# Patient Record
Sex: MALE | Race: WHITE | URBAN - METROPOLITAN AREA
[De-identification: names, ages, dates, MRNs, and addresses within clinical notes are randomized per-mention and may not be internally consistent; named-entity substitution may affect disease eponyms.]

---

## 2017-01-18 ENCOUNTER — PRE VISIT (OUTPATIENT)
Dept: SURGERY | Facility: CLINIC | Age: 67
End: 2017-01-18

## 2017-01-18 NOTE — TELEPHONE ENCOUNTER
1.  Date/reason for appt:  3/07/17   Fistula    2.  Referring provider:  Self    3.  Call to patient (Yes / No - short description):  No, transferred from [facility].      Dr Gretchen Cam [facility]  Emailed SERGIO forms to [email or web address]

## 2017-01-19 NOTE — TELEPHONE ENCOUNTER
Radiology reports received from [facility], will forward to clinic. Waiting for imaging disc.   Xray KUB plat plate abdomen on 12/4/15, 1/16/15  US renal kidney on 12/4/15, 4/16/14, 10/30/13  Xray kidney on 8/13/13  Xray chest on 8/6/13  CT abdomen pelvis on 8/6/13

## 2017-01-23 NOTE — TELEPHONE ENCOUNTER
Records received from Dr. Brannon's office - [facility]. Forwarded records to clinic.   (Waiting for imaging)     Records included:  Op note 4/29/14 Left buttock mass   Op note 8/1/14 Sigmoidoscopy - to rule out fistula   Path report 3/17/14 FNA Left Buttock  Path report 4/29/14 Left Buttock   CT Pelvis 2/5/14  US L Buttocks Nodule Biopsy 3/17/14  CT Pelvis 7/21/14  MR Pelvis 7/22/16     Missing/Additional Info:   -Referred to Dr. Echeverria in Colon and Rectal @ [facility] - had rectal exam under anesthesia on 12/2/14    -Went to [facility] for another opinion and had imaging done    -Also had a consult with Plastic Surgery @ [facility] in [place] for gracilis flap (It was determined that that this would not be a good idea because of sensation of needing to have bowel movement so it was not done.)     -Seen at [place] at [facility] and LIFT procedure was recommended

## 2017-01-24 NOTE — TELEPHONE ENCOUNTER
[facility] records forwarded to clinic.   Office notes: 5/15/15, 6/30/15, 7/21/15, 8/20/15, 9/4/15, 10/2/15, 11/20/15, 12/30/15, 3/4/16, 5/14/16   Op Notes:     8/20/15 I&D or perirectal abscess and seton placement     11/16/15 Partial fistulotomy and fistulectomy with washout    2/22/16 Rectal mucosal advancement flap     3/22/16 Placement of seton   Radiology Report:    MRI Pelvis 8/13/15     *missing 2014 records (Dr. Echeverria)

## 2017-02-03 ENCOUNTER — TRANSFERRED RECORDS (OUTPATIENT)
Dept: HEALTH INFORMATION MANAGEMENT | Facility: CLINIC | Age: 67
End: 2017-02-03

## 2017-02-21 ENCOUNTER — TELEPHONE (OUTPATIENT)
Dept: SURGERY | Facility: CLINIC | Age: 67
End: 2017-02-21

## 2017-02-21 NOTE — TELEPHONE ENCOUNTER
Due to provider unavailability, patient's appointment on 03/07/2017 needs to be rescheduled.  Called patient and left a message informing him of this.  Provided my direct number to reschedule.

## 2017-02-24 NOTE — TELEPHONE ENCOUNTER
Received patient's chronological health history, sent to clinic.      Sent second requests:  Imaging from [facility] Dr Godinez [place]  Records from Dr Echeverria [facility]

## 2017-03-01 NOTE — TELEPHONE ENCOUNTER
[facility] CT's Pelvis 2/05/14 and 7/21/14    Sent to film room

## 2017-03-03 ENCOUNTER — TELEPHONE (OUTPATIENT)
Dept: SURGERY | Facility: CLINIC | Age: 67
End: 2017-03-03

## 2017-03-03 NOTE — TELEPHONE ENCOUNTER
Spoke with Dr. Gray regarding patient's appointment, as patient already purchased plane tickets for appointment.  Dr. Gray has agreed to see patient 3/7/17 at 7:00 am.  Called and spoke with patient.  Patient confirms new appointment time.  Patient states he will be staying at the [facility].  Instructed patient he can walk to B and take the shuttle to [facility], or walk directly to [facility].  Patient states he will take the shuttle from [facility].  Patient has my direct number for questions or concerns.

## 2017-03-07 ENCOUNTER — OFFICE VISIT (OUTPATIENT)
Dept: SURGERY | Facility: CLINIC | Age: 67
End: 2017-03-07

## 2017-03-07 VITALS
HEART RATE: 63 BPM | TEMPERATURE: 97.7 F | HEIGHT: 70 IN | DIASTOLIC BLOOD PRESSURE: 89 MMHG | BODY MASS INDEX: 28.22 KG/M2 | WEIGHT: 197.1 LBS | SYSTOLIC BLOOD PRESSURE: 136 MMHG | OXYGEN SATURATION: 99 %

## 2017-03-07 DIAGNOSIS — K60.30 ANAL FISTULA: Primary | ICD-10-CM

## 2017-03-07 RX ORDER — NIACIN 500 MG/1
TABLET ORAL
COMMUNITY
Start: 2000-01-01

## 2017-03-07 RX ORDER — TAMSULOSIN HYDROCHLORIDE 0.4 MG/1
CAPSULE ORAL
COMMUNITY
Start: 2012-12-12

## 2017-03-07 RX ORDER — AMOXICILLIN 500 MG
CAPSULE ORAL
COMMUNITY
Start: 2000-01-01

## 2017-03-07 RX ORDER — IBUPROFEN 200 MG
TABLET ORAL
COMMUNITY
Start: 2016-01-01

## 2017-03-07 RX ORDER — EZETIMIBE 10 MG/1
TABLET ORAL
COMMUNITY
Start: 2000-01-01

## 2017-03-07 RX ORDER — CHOLECALCIFEROL (VITAMIN D3) 25 MCG
CAPSULE ORAL
COMMUNITY
Start: 2010-01-01

## 2017-03-07 RX ORDER — MIRTAZAPINE 15 MG/1
TABLET, FILM COATED ORAL
COMMUNITY
Start: 2017-02-15

## 2017-03-07 RX ORDER — ROSUVASTATIN CALCIUM 10 MG/1
TABLET, COATED ORAL
COMMUNITY
Start: 2000-01-01

## 2017-03-07 ASSESSMENT — PAIN SCALES - GENERAL: PAINLEVEL: MILD PAIN (2)

## 2017-03-07 NOTE — MR AVS SNAPSHOT
"              After Visit Summary   3/7/2017    Renaldo Louie    MRN: [id number]           Patient Information     Date Of Birth          1950        Visit Information        Provider Department      3/7/2017 7:00 AM Matt Gray MD [facility] Colon and Rectal Surgery        Today's Diagnoses     Anal fistula    -  1       Follow-ups after your visit        Who to contact     Please call your clinic at [phone] to:    Ask questions about your health    Make or cancel appointments    Discuss your medicines    Learn about your test results    Speak to your doctor   If you have compliments or concerns about an experience at your clinic, or if you wish to file a complaint, please contact [facility] Patient Relations at [phone] or email us at [email or web address]         Additional Information About Your Visit        MyChart Information     [employer] gives you secure access to your electronic health record. If you see a primary care provider, you can also send messages to your care team and make appointments. If you have questions, please call your primary care clinic.  If you do not have a primary care provider, please call [phone] and they will assist you.      [employer] is an electronic gateway that provides easy, online access to your medical records. With [employer], you can request a clinic appointment, read your test results, renew a prescription or communicate with your care team.     To access your existing account, please contact your [facility] or call [phone] for assistance.        Care EveryWhere ID     This is your Care EveryWhere ID. This could be used by other organizations to access your [facility] medical records  [id number]        Your Vitals Were     Pulse Temperature Height Pulse Oximetry BMI (Body Mass Index)       63 97.7  F (36.5  C) (Oral) 1.778 m (5' 10\") 99% 28.28 kg/m2        Blood Pressure from Last 3 " Encounters:   No data found for BP    Weight from Last 3 Encounters:   No data found for Wt              Today, you had the following     No orders found for display       Primary Care Provider    None       No address on file        Thank you!     Thank you for choosing [facility] COLON AND RECTAL SURGERY  for your care. Our goal is always to provide you with excellent care. Hearing back from our patients is one way we can continue to improve our services. Please take a few minutes to complete the written survey that you may receive in the mail after your visit with us. Thank you!             Your Updated Medication List - Protect others around you: Learn how to safely use, store and throw away your medicines at www.disposemymeds.org.          This list is accurate as of: 3/7/17 11:59 PM.  Always use your most recent med list.                   Brand Name Dispense Instructions for use    aspirin 81 MG tablet          CRESTOR 10 MG tablet   Generic drug:  rosuvastatin          ezetimibe 10 MG tablet    ZETIA         Fish Oil 1200 MG Caps          ibuprofen 200 MG tablet    ADVIL/MOTRIN         mirtazapine 15 MG tablet    REMERON         Niacin (Antihyperlipidemic) 500 MG Tabs          RA GAUZE BANDAGE Misc      Apply 2-3 Units topically       tamsulosin 0.4 MG capsule    FLOMAX         Vitamin D-3 1000 UNITS Caps

## 2017-03-07 NOTE — PROGRESS NOTES
Colon and Rectal Surgery Clinic Note      RE: Renaldo Louie  : 1950  LAMAR: 3/7/2017            For details of past medical history, surgical history, family history, medications, allergies, and review of systems, please see details below.    Medical history:  No past medical history on file.    Surgical history:HISTORY OF PRESENT ILLNESS:  Dakota is an extremely pleasant retired  who is seen today for an additional opinion regarding an anal fistula.  Dakota snowden from [place], [place].  He has a history of brachytherapy for prostate cancer in .  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  This did not evolve rapidly, but he was advised to have this taken care of, and he underwent incision and drainage of chronic abscess by Dr. Martín Godinez.  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  He was seen by Dr. Cain Echeverria and underwent 3 operations in order to attempt to shorten the fistula tract in  and .  We do not have those records available.  Dr. Echeverria placed a seton.  The patient felt communication was poor and he transferred his care to [place] where he was treated by Dr. Mariana Paige.  He had several additional exams under anesthesia with an attempt to shorten his tract.  Because of the brachytherapy, it was felt that hyperbaric oxygen therapy might be helpful, and he underwent 60 treatments.  On 2016, Dr. Paige performed an endorectal mucosal advancement flap.  This procedure subsequently failed and a seton was replaced.  The patient met with 2 plastic surgeons, one of whom advised a gracilis flap and the second one advised against it.  Because of ongoing drainage, the patient now presents to the [facility] for an additional opinion and possible treatment.  Dakota reports that his continence has remained intact, but he has enough urgency  that when he gets a call to stool that he must start looking for a bathroom.  This is not severe urgency and he has time to get there and has not had accidents, but it is at least a concern.  He does have fairly copious drainage from his fistula.  He also has a low level of discomfort which in general is not terribly bothersome but episodically can interfere with his lifestyle.  He is an avid fly fisherman and hiker.      Dakota is a nonsmoker and a moderate drinker.  He is a retired .  He is  and lives with his wife.      PHYSICAL EXAMINATION:  Well-developed, well-nourished, very pleasant man in no acute distress.  The patient is 5 feet 10 inches and weighs 197 pounds.  BMI is 28.  Perianal examination demonstrates a loose seton emerging from a chronic external opening in the left buttock.  The external opening is still roughly 5 cm from the anal margin, slightly posterior of the mid anal line.  There is palpable scarring beneath this emanating towards the posterior midline.  Digital rectal examination shows good sphincter tone and an easily palpable chronically scarred internal opening.  There was good squeeze pressure.  There are no masses and no palpable prostatic abnormalities.  The prostate feels shrunken.  Anoscopy confirms a relatively wide-mouthed internal opening in the posterior midline with the seton emerging from it.      I reviewed the patient's recent outside MRI from 07/15/2016.  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      I had a detailed discussion with Dakota regarding the treatment options.  I explained that complex fistulas such as his are difficult to treat, a fact he is already very well aware of, and success rates are not high irrespective of the methods chosen.   I am a little dubious that a gracilis flap procedure would likely be beneficial as it would not reliably address the major  problem of the internal opening in the anal wall.  I am concerned about the possibility of the gracilis actually fitting into the necessary space.  I believe the best options would include an attempted LIFT procedure.  I used our rectal model to explain the nature of the procedure in detail to the patient.  I told him I thought the success rate would be at best on the order of 20%-25%, including the possibility of requiring a fistulotomy if the fistula was able to be converted to an intersphincteric rather than transsphincteric fistula.  I should note that on the MRI the fistula extends to but remains below the puborectalis.  I explained that the procedure could result in alterations in continence, especially if a secondary fistulotomy was necessary.  The other option would be a full-thickness endorectal advancement flap, which I believe has a higher success rate than a mucosal advancement flap.  Nonetheless, I believe this still would have a substantial failure rate, and I again quoted a potential success rate with this procedure of 20%-25%.  We discussed the potential role for diverting ileostomy.  I explained that the data supporting ileostomy was relatively sparse, but given this is a multiply reoperated area with several failures, it would probably be wise to place a laparoscopic ileostomy to take the most aggressive possible stance for what is probably his best remaining shot of treating the fistula.  We also discussed the possibility of simply leaving the seton in on an indefinite basis and finally discussed performing a complete fistulotomy, which some specialists are performing, though I explained this might have quite a marked effect on his continence.  He is not interested in that option, and I did not advise it.      I told Dakota I would try and communicate with Dr. Echeverria and get her previous operative notes.  I should note that he has had multiple colonoscopies over the years and has never had anything  "suggestive of Crohn disease.  His bowels are normal and he does not have diarrhea to suggest underlying Crohn disease.      Dakota will think the matter over, and I told him I would similarly do so.  He will stay in touch Candi Barakat RN      I answered all of Dakota' questions to his stated satisfaction.  He expressed an understanding and agreement with the proposed plan.      Total time 45 minutes.  Greater than half the time spent in counseling.         No past surgical history on file.    Family history:  No family history on file.    Medications:  Current Outpatient Prescriptions   Medication Sig Dispense Refill     aspirin 81 MG tablet        rosuvastatin (CRESTOR) 10 MG tablet        ezetimibe (ZETIA) 10 MG tablet        Omega-3 Fatty Acids (FISH OIL) 1200 MG CAPS        ibuprofen (ADVIL/MOTRIN) 200 MG tablet        mirtazapine (REMERON) 15 MG tablet        Niacin, Antihyperlipidemic, 500 MG TABS        tamsulosin (FLOMAX) 0.4 MG capsule        Cholecalciferol (VITAMIN D-3) 1000 UNITS CAPS        Gauze Pads & Dressings (RA GAUZE BANDAGE) MISC Apply 2-3 Units topically       Allergies:  The patienthas No Known Allergies.    Social history:  Social History   Substance Use Topics     Smoking status: Never Smoker     Smokeless tobacco: Not on file     Alcohol use 4.2 oz/week     7 Standard drinks or equivalent per week      Comment: 1 drink with dinner     Marital status: .    Review of Systems:  Nursing Notes:   Brittnee Mustafa LPN  3/7/2017  7:19 AM  Signed  Chief Complaint   Patient presents with     Consult     fistula per Dr. Martín Godinez       Vitals:    03/07/17 0708   BP: 136/89   BP Location: Left arm   Patient Position: Chair   Cuff Size: Adult Large   Pulse: 63   Temp: 97.7  F (36.5  C)   TempSrc: Oral   SpO2: 99%   Weight: 197 lb 1.6 oz   Height: 5' 10\"       Body mass index is 28.28 kg/(m^2).      Brittnee Gray MD   Professor and Chief  Division " of Colon and Rectal Surgery  [facility]      Referring Provider:  Referred Self, MD  No address on file     Primary Care Provider:  None    Answers for HPI/ROS submitted by the patient on 3/4/2017   General Symptoms: No  Skin Symptoms: No  HENT Symptoms: No  EYE SYMPTOMS: No  HEART SYMPTOMS: No  LUNG SYMPTOMS: No  INTESTINAL SYMPTOMS: No  URINARY SYMPTOMS: No  REPRODUCTIVE SYMPTOMS: No  SKELETAL SYMPTOMS: No  BLOOD SYMPTOMS: No  NERVOUS SYSTEM SYMPTOMS: No  MENTAL HEALTH SYMPTOMS: No

## 2017-03-07 NOTE — PROGRESS NOTES
"Colon and Rectal Surgery Clinic Note      RE: Renaldo Louie  : 1950  LAMAR: 3/7/2017            For details of past medical history, surgical history, family history, medications, allergies, and review of systems, please see details below.    Medical history:  No past medical history on file.    Surgical history:  No past surgical history on file.    Family history:  No family history on file.    Medications:  Current Outpatient Prescriptions   Medication Sig Dispense Refill     aspirin 81 MG tablet        rosuvastatin (CRESTOR) 10 MG tablet        ezetimibe (ZETIA) 10 MG tablet        Omega-3 Fatty Acids (FISH OIL) 1200 MG CAPS        ibuprofen (ADVIL/MOTRIN) 200 MG tablet        mirtazapine (REMERON) 15 MG tablet        Niacin, Antihyperlipidemic, 500 MG TABS        tamsulosin (FLOMAX) 0.4 MG capsule        Cholecalciferol (VITAMIN D-3) 1000 UNITS CAPS        Gauze Pads & Dressings (RA GAUZE BANDAGE) MISC Apply 2-3 Units topically       Allergies:  The patienthas No Known Allergies.    Social history:  Social History   Substance Use Topics     Smoking status: Never Smoker     Smokeless tobacco: Not on file     Alcohol use 4.2 oz/week     7 Standard drinks or equivalent per week      Comment: 1 drink with dinner     Marital status: .    Review of Systems:  Nursing Notes:   Brittnee Mustafa LPN  3/7/2017  7:19 AM  Signed  Chief Complaint   Patient presents with     Consult     fistula per Dr. Martín Godinez       Vitals:    17 0708   BP: 136/89   BP Location: Left arm   Patient Position: Chair   Cuff Size: Adult Large   Pulse: 63   Temp: 97.7  F (36.5  C)   TempSrc: Oral   SpO2: 99%   Weight: 197 lb 1.6 oz   Height: 5' 10\"       Body mass index is 28.28 kg/(m^2).      Brittnee Grya MD   Professor and Chief  Division of Colon and Rectal Surgery  [facility]      Referring Provider:  Referred Self, MD  No address on file "     Primary Care Provider:  None

## 2017-03-07 NOTE — Clinical Note
3/7/2017       RE: Renaldo Louie  [address] [place] [id number]     Dear Colleague,    Thank you for referring your patient, Renaldo Louie, to the [facility] COLON [facility] RE[facility]AL SURGERY at [facility]. Please see a copy of my visit note below.    Colon and Rectal Surgery Clinic Note      RE: Renaldo Louie  : 1950  LAMAR: 3/7/2017            For details of past medical history, surgical history, family history, medications, allergies, and review of systems, please see details below.    Medical history:  No past medical history on file.    Surgical history:HISTORY OF PRESENT ILLNESS:  Dakota is an extremely pleasant retired  who is seen today for an additional opinion regarding an anal fistula.  Dakota snowden from [place], [place].  He has a history of brachytherapy for prostate cancer in .  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  This did not evolve rapidly, but he was advised to have this taken care of, and he underwent incision and drainage of chronic abscess by Dr. Martín Godinez.  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  He was seen by Dr. Cain Echeverria and underwent 3 operations in order to attempt to shorten the fistula tract in  and .  We do not have those records available.  Dr. Echeverria placed a seton.  The patient felt communication was poor and he transferred his care to [place] where he was treated by Dr. Mariana Paige.  He had several additional exams under anesthesia with an attempt to shorten his tract.  Because of the brachytherapy, it was felt that hyperbaric oxygen therapy might be helpful, and he underwent 60 treatments.  On 2016, Dr. Paige performed an endorectal mucosal advancement flap.  This procedure subsequently failed and a seton was replaced.  The patient met with 2 plastic surgeons, one  of whom advised a gracilis flap and the second one advised against it.  Because of ongoing drainage, the patient now presents to the [facility] for an additional opinion and possible treatment.  Dakota reports that his continence has remained intact, but he has enough urgency that when he gets a call to stool that he must start looking for a bathroom.  This is not severe urgency and he has time to get there and has not had accidents, but it is at least a concern.  He does have fairly copious drainage from his fistula.  He also has a low level of discomfort which in general is not terribly bothersome but episodically can interfere with his lifestyle.  He is an avid fly fisherman and hiker.      Dakota is a nonsmoker and a moderate drinker.  He is a retired .  He is  and lives with his wife.      PHYSICAL EXAMINATION:  Well-developed, well-nourished, very pleasant man in no acute distress.  The patient is 5 feet 10 inches and weighs 197 pounds.  BMI is 28.  Perianal examination demonstrates a loose seton emerging from a chronic external opening in the left buttock.  The external opening is still roughly 5 cm from the anal margin, slightly posterior of the mid anal line.  There is palpable scarring beneath this emanating towards the posterior midline.  Digital rectal examination shows good sphincter tone and an easily palpable chronically scarred internal opening.  There was good squeeze pressure.  There are no masses and no palpable prostatic abnormalities.  The prostate feels shrunken.  Anoscopy confirms a relatively wide-mouthed internal opening in the posterior midline with the seton emerging from it.      I reviewed the patient's recent outside MRI from 07/15/2016.  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      I had a detailed discussion with Dakota regarding the treatment options.  I explained  that complex fistulas such as his are difficult to treat, a fact he is already very well aware of, and success rates are not high irrespective of the methods chosen.   I am a little dubious that a gracilis flap procedure would likely be beneficial as it would not reliably address the major problem of the internal opening in the anal wall.  I am concerned about the possibility of the gracilis actually fitting into the necessary space.  I believe the best options would include an attempted LIFT procedure.  I used our rectal model to explain the nature of the procedure in detail to the patient.  I told him I thought the success rate would be at best on the order of 20%-25%, including the possibility of requiring a fistulotomy if the fistula was able to be converted to an intersphincteric rather than transsphincteric fistula.  I should note that on the MRI the fistula extends to but remains below the puborectalis.  I explained that the procedure could result in alterations in continence, especially if a secondary fistulotomy was necessary.  The other option would be a full-thickness endorectal advancement flap, which I believe has a higher success rate than a mucosal advancement flap.  Nonetheless, I believe this still would have a substantial failure rate, and I again quoted a potential success rate with this procedure of 20%-25%.  We discussed the potential role for diverting ileostomy.  I explained that the data supporting ileostomy was relatively sparse, but given this is a multiply reoperated area with several failures, it would probably be wise to place a laparoscopic ileostomy to take the most aggressive possible stance for what is probably his best remaining shot of treating the fistula.  We also discussed the possibility of simply leaving the seton in on an indefinite basis and finally discussed performing a complete fistulotomy, which some specialists are performing, though I explained this might have quite a  marked effect on his continence.  He is not interested in that option, and I did not advise it.      I told Dakota I would try and communicate with Dr. Echeverria and get her previous operative notes.  I should note that he has had multiple colonoscopies over the years and has never had anything suggestive of Crohn disease.  His bowels are normal and he does not have diarrhea to suggest underlying Crohn disease.      Dakota will think the matter over, and I told him I would similarly do so.  He will stay in touch Candi Barakat RN      I answered all of Dakota' questions to his stated satisfaction.  He expressed an understanding and agreement with the proposed plan.      Total time 45 minutes.  Greater than half the time spent in counseling.         No past surgical history on file.    Family history:  No family history on file.    Medications:  Current Outpatient Prescriptions   Medication Sig Dispense Refill     aspirin 81 MG tablet        rosuvastatin (CRESTOR) 10 MG tablet        ezetimibe (ZETIA) 10 MG tablet        Omega-3 Fatty Acids (FISH OIL) 1200 MG CAPS        ibuprofen (ADVIL/MOTRIN) 200 MG tablet        mirtazapine (REMERON) 15 MG tablet        Niacin, Antihyperlipidemic, 500 MG TABS        tamsulosin (FLOMAX) 0.4 MG capsule        Cholecalciferol (VITAMIN D-3) 1000 UNITS CAPS        Gauze Pads & Dressings (RA GAUZE BANDAGE) MISC Apply 2-3 Units topically       Allergies:  The patienthas No Known Allergies.    Social history:  Social History   Substance Use Topics     Smoking status: Never Smoker     Smokeless tobacco: Not on file     Alcohol use 4.2 oz/week     7 Standard drinks or equivalent per week      Comment: 1 drink with dinner     Marital status: .    Review of Systems:  Nursing Notes:   Brittnee Mustafa LPN  3/7/2017  7:19 AM  Signed  Chief Complaint   Patient presents with     Consult     fistula per Dr. Martín Godinez       Vitals:    03/07/17 0708   BP: 136/89   BP Location: Left arm   Patient  "Position: Chair   Cuff Size: Adult Large   Pulse: 63   Temp: 97.7  F (36.5  C)   TempSrc: Oral   SpO2: 99%   Weight: 197 lb 1.6 oz   Height: 5' 10\"       Body mass index is 28.28 kg/(m^2).      Brittnee Gray MD   Professor and Chief  Division of Colon and Rectal Surgery  [facility]      Referring Provider:  Referred Self, MD  No address on file     Primary Care Provider:  None    Answers for HPI/ROS submitted by the patient on 3/4/2017   General Symptoms: No  Skin Symptoms: No  HENT Symptoms: No  EYE SYMPTOMS: No  HEART SYMPTOMS: No  LUNG SYMPTOMS: No  INTESTINAL SYMPTOMS: No  URINARY SYMPTOMS: No  REPRODUCTIVE SYMPTOMS: No  SKELETAL SYMPTOMS: No  BLOOD SYMPTOMS: No  NERVOUS SYSTEM SYMPTOMS: No  MENTAL HEALTH SYMPTOMS: No      Again, thank you for allowing me to participate in the care of your patient.      Sincerely,    Matt Gray MD    "

## 2017-03-07 NOTE — LETTER
Date:March 15, 2017      Patient was self referred, no letter generated. Do not send.        [facility] Information

## 2017-03-07 NOTE — NURSING NOTE
"Chief Complaint   Patient presents with     Consult     fistula per Dr. Martín Godinez       Vitals:    03/07/17 0708   BP: 136/89   BP Location: Left arm   Patient Position: Chair   Cuff Size: Adult Large   Pulse: 63   Temp: 97.7  F (36.5  C)   TempSrc: Oral   SpO2: 99%   Weight: 197 lb 1.6 oz   Height: 5' 10\"       Body mass index is 28.28 kg/(m^2).      Brittnee Mustafa                          "

## 2017-03-13 NOTE — TELEPHONE ENCOUNTER
Forwarded [facility] and [facility] records to clinic.

## 2018-01-17 ENCOUNTER — TRANSFERRED RECORDS (OUTPATIENT)
Dept: HEALTH INFORMATION MANAGEMENT | Facility: CLINIC | Age: 68
End: 2018-01-17

## 2020-03-10 ENCOUNTER — HEALTH MAINTENANCE LETTER (OUTPATIENT)
Age: 70
End: 2020-03-10

## 2020-07-16 ENCOUNTER — TELEPHONE (OUTPATIENT)
Dept: SURGERY | Facility: CLINIC | Age: 70
End: 2020-07-16

## 2020-07-16 NOTE — TELEPHONE ENCOUNTER
[facility] Call Center    Phone Message    May a detailed message be left on voicemail: yes     Reason for Call: Other: Pt calling in to speak with the provider regarding a prior consultation he had on 3/17/2020. Please follow up when available. Thank you      Action Taken: Message routed to:  [facility] Colon Rect [facility]urg    Travel Screening: Not Applicable

## 2020-07-17 NOTE — TELEPHONE ENCOUNTER
Pt wants to discuss fistula repair with Dr. Gray and possible bx of tissue near fistula. Will get recs from OSH and set up virtual visit. Pt stated understanding

## 2020-07-20 ENCOUNTER — DOCUMENTATION ONLY (OUTPATIENT)
Dept: SURGERY | Facility: CLINIC | Age: 70
End: 2020-07-20

## 2020-07-20 NOTE — PROGRESS NOTES
Action 7/20/2020 4:19 PM -ao   Action Taken Fax request sent to [facility] for Ileostomy Report per request for RN.

## 2020-07-22 NOTE — PROGRESS NOTES
"Colon and Rectal Surgery Clinic Note      RE: Renaldo Louie  : 1950  LAMAR: 2020    Renaldo Louie is a 70 year old male who is being evaluated via a billable video visit.      The patient has been notified of following:     \"This video visit will be conducted via a call between you and your physician/provider. We have found that certain health care needs can be provided without the need for an in-person physical exam.  This service lets us provide the care you need with a video conversation.  If a prescription is necessary we can send it directly to your pharmacy.  If lab work is needed we can place an order for that and you can then stop by our lab to have the test done at a later time.    Video visits are billed at different rates depending on your insurance coverage.  Please reach out to your insurance provider with any questions.    If during the course of the call the physician/provider feels a video visit is not appropriate, you will not be charged for this service.\"    Patient has given verbal consent for Video visit? Yes    Video Start Time: 10:33 error: 10:33 end time.  Start time: 10:07    Dakota has a visit today for follow up of anal fistula.    HPI:  I met with Dakota in 2017 for his anal fistula.    History of brachytherapy for prostate cancer in .      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  This did not evolve rapidly, but he was advised to have this taken care of, and he underwent incision and drainage of chronic abscess by Dr. Martín Godinez.      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  He was seen by Dr. Cain Echeverria and underwent 3 operations in order to attempt to shorten the fistula tract in  and . Dr. Echeverria placed a seton.    He transferred his care to [place] where he was treated by Dr. Mariana Paige.  He had several additional " exams under anesthesia with an attempt to shorten his tract.  Because of the brachytherapy, it was felt that hyperbaric oxygen therapy might be helpful, and he underwent 60 treatments.      On 02/22/2016, Dr. Paige performed an endorectal mucosal advancement flap.  This procedure subsequently failed and a seton was replaced.  The patient met with 2 plastic surgeons, one of whom advised a gracilis flap and the second one advised against it.    Multiple colonoscopies over the years and has never had anything suggestive of Crohn disease    When I met with him in 2017, I had recommended a LIFT procedure with diverting ileostomy    Exam from 2017: loose seton emerging from a chronic external opening in the left buttock.  The external opening is still roughly 5 cm from the anal margin, slightly posterior of the mid anal line.  There is palpable scarring beneath this emanating towards the posterior midline.  Digital rectal examination shows good sphincter tone and an easily palpable chronically scarred internal opening.  There was good squeeze pressure.  There are no masses and no palpable prostatic abnormalities.  The prostate feels shrunken.  Anoscopy confirms a relatively wide-mouthed internal opening in the posterior midline with the seton emerging from it.     Laboratory data:    No results for input(s): WBC, HGB, PLT, CR, ALBUMIN, BILITOTAL, ALKPHOS, ALT, AST, INR in the last [id number] hours.      Updated history:        Assessment/plan: Since I last saw Dakota he has established care with Dr. Boris Means in [place].  Dr. Means performed a laparoscopic ileostomy and this has substantially improved the patient's fistula symptoms.  He is managing the ileostomy without much difficulty, though he did require an emergency room visit for what sounds like a parastomal hernia that was managed conservatively.  He was observed in the hospital overnight and discharged.  The pelvic pain associated with his fistula has largely  relented, though more recently he has had some new symptoms and Dr. Means plans to place a seton.  They have discussed another attempt at closing the internal opening, which lies in the posterior midline.  Dr. Means has advised a repeat mucosal advancement flap.  I had previously advised a LIFT procedure, which would at least offer an unsightly operative field as rest had a previous failed mucosal advancement flap by Dr. Mariana Paige.  I advised that given the multiple previous operations and brachii therapy that either operation would have a very significant failure rate; my best guess is that a LIFTwould have a 20 to 25% success rate.  I think the success of an endorectal advancement flap would be similar or less, and opined that a full-thickness flap to my mind would have a higher chance of healing than a mucosal flap alone.  Dakota is consulted several plastic surgeons regarding the previous suggestion of a gracilis transposition.  I again emphasized that the fundamental problem is the internal opening that needs to be healed and that a gracilis would not necessarily accomplish this.  His tract has evidently shrunken, and in the absence of an actual cavity exactly how to get the chrysalis in place and where to put it seems to me to be problematic.  The most recent plastic surgery consultations seem to have advised against the gracilis so I do not think this is an active issue.  I endorsed the plan for seton at present.  Dakota can decide if he wants to maintain his stoma on a permanent basis and, if so, he might consider having a colostomy placed rather than an ileostomy to facilitate management and allow him the opportunity of stoma irrigation if he prefers.  If he has recurrent problems with his parastomal hernia, he may require an operation in any case.  We reviewed all the foregoing in very substantial detail.  I answered all of aDkota's questions to his stated satisfaction.  He has a quite clear understanding of his  situation and has done quite a bit of reading about it.  I answered all of his questions to his stated satisfaction.  He expressed his understanding.  His plan is to continue his ongoing care with Dr. Means, and I endorsed him as an outstanding clinician and surgeon.    Total time 26 minutes.  Essentially the entire time was counseling.    Video-Visit Details    Type of service:  Video Visit    Video End Time (time video stopped): 10:33    Originating Location (pt. Location): Home    Distant Location (provider location):  [facility] COLON AND RECTAL SURGERY     Mode of Communication:  Video Conference via Piece[employer].    For details of past medical history, surgical history, family history, medications, allergies, and review of systems, please see details below.    Medical history:  No past medical history on file.    Surgical history:  No past surgical history on file.    Family history:  No family history on file.    Medications:  Current Outpatient Medications   Medication Sig Dispense Refill     aspirin 81 MG tablet        Cholecalciferol (VITAMIN D-3) 1000 UNITS CAPS        ezetimibe (ZETIA) 10 MG tablet        Gauze Pads & Dressings (RA GAUZE BANDAGE) MISC Apply 2-3 Units topically       ibuprofen (ADVIL/MOTRIN) 200 MG tablet        mirtazapine (REMERON) 15 MG tablet        Niacin, Antihyperlipidemic, 500 MG TABS        Omega-3 Fatty Acids (FISH OIL) 1200 MG CAPS        rosuvastatin (CRESTOR) 10 MG tablet        tamsulosin (FLOMAX) 0.4 MG capsule          Allergies:  The patienthas No Known Allergies.    Social history:  Social History     Tobacco Use     Smoking status: Never Smoker   Substance Use Topics     Alcohol use: Yes     Alcohol/week: 7.0 standard drinks     Types: 7 Standard drinks or equivalent per week     Comment: 1 drink with dinner     Marital status: .    Review of Systems:  There are no exam notes on file for this visit.     Matt Gray MD   Professor and Chief  Division of Colon and  Rectal Surgery  [facility]      Referring Provider:  Referred Self, MD  No address on file     Primary Care Provider:  No primary care provider on file.    This note was created using speech recognition software and may contain unintended word substitutions.

## 2020-07-23 ENCOUNTER — VIRTUAL VISIT (OUTPATIENT)
Dept: SURGERY | Facility: CLINIC | Age: 70
End: 2020-07-23
Payer: COMMERCIAL

## 2020-07-23 VITALS — HEIGHT: 70 IN | WEIGHT: 188 LBS | BODY MASS INDEX: 26.92 KG/M2

## 2020-07-23 DIAGNOSIS — K60.30 ANAL FISTULA: Primary | ICD-10-CM

## 2020-07-23 ASSESSMENT — PAIN SCALES - GENERAL: PAINLEVEL: NO PAIN (0)

## 2020-07-23 ASSESSMENT — MIFFLIN-ST. JEOR: SCORE: 1619.01

## 2020-07-23 NOTE — LETTER
"2020       RE: Renaldo Louie  [address] [place] [address]     Dear Colleague,    Thank you for referring your patient, Renaldo Louie, to the [facility] Please see a copy of my visit note below.    Colon and Rectal Surgery Clinic Note      RE: Renaldo Louie  : 1950  LAMAR: 2020    Renaldo Louie is a 70 year old male who is being evaluated via a billable video visit.      The patient has been notified of following:     \"This video visit will be conducted via a call between you and your physician/provider. We have found that certain health care needs can be provided without the need for an in-person physical exam.  This service lets us provide the care you need with a video conversation.  If a prescription is necessary we can send it directly to your pharmacy.  If lab work is needed we can place an order for that and you can then stop by our lab to have the test done at a later time.    Video visits are billed at different rates depending on your insurance coverage.  Please reach out to your insurance provider with any questions.    If during the course of the call the physician/provider feels a video visit is not appropriate, you will not be charged for this service.\"    Patient has given verbal consent for Video visit? Yes    Video Start Time: 10:33 error: 10:33 end time.  Start time: 10:07    Dakota has a visit today for follow up of anal fistula.    HPI:  I met with Dakota in 2017 for his anal fistula.    History of brachytherapy for prostate cancer in .      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  This did not evolve rapidly, but he was advised to have this taken care of, and he underwent incision and drainage of chronic abscess by Dr. Martín Godinez.      He had a chronically draining wound and CT confirmed the " presence of an anal fistula.  He was seen by Dr. Cain Echeverria and underwent 3 operations in order to attempt to shorten the fistula tract in 2014 and 2015. Dr. Echeverria placed a seton.    He transferred his care to [place] where he was treated by Dr. Mariana Paige.  He had several additional exams under anesthesia with an attempt to shorten his tract.  Because of the brachytherapy, it was felt that hyperbaric oxygen therapy might be helpful, and he underwent 60 treatments.      On 02/22/2016, Dr. Paige performed an endorectal mucosal advancement flap.  This procedure subsequently failed and a seton was replaced.  The patient met with 2 plastic surgeons, one of whom advised a gracilis flap and the second one advised against it.    Multiple colonoscopies over the years and has never had anything suggestive of Crohn disease    When I met with him in 2017, I had recommended a LIFT procedure with diverting ileostomy    Exam from 2017: loose seton emerging from a chronic external opening in the left buttock.  The external opening is still roughly 5 cm from the anal margin, slightly posterior of the mid anal line.  There is palpable scarring beneath this emanating towards the posterior midline.  Digital rectal examination shows good sphincter tone and an easily palpable chronically scarred internal opening.  There was good squeeze pressure.  There are no masses and no palpable prostatic abnormalities.  The prostate feels shrunken.  Anoscopy confirms a relatively wide-mouthed internal opening in the posterior midline with the seton emerging from it.     Laboratory data:    No results for input(s): WBC, HGB, PLT, CR, ALBUMIN, BILITOTAL, ALKPHOS, ALT, AST, INR in the last 65920 hours.      Updated history:        Assessment/plan: Since I last saw Dakota he has established care with Dr. Boris Means in [place].  Dr. Means performed a laparoscopic ileostomy and this has substantially improved the patient's fistula symptoms.  He is  managing the ileostomy without much difficulty, though he did require an emergency room visit for what sounds like a parastomal hernia that was managed conservatively.  He was observed in the hospital overnight and discharged.  The pelvic pain associated with his fistula has largely relented, though more recently he has had some new symptoms and Dr. Means plans to place a seton.  They have discussed another attempt at closing the internal opening, which lies in the posterior midline.  Dr. Means has advised a repeat mucosal advancement flap.  I had previously advised a LIFT procedure, which would at least offer an unsightly operative field as rest had a previous failed mucosal advancement flap by Dr. Mariana Paige.  I advised that given the multiple previous operations and brachii therapy that either operation would have a very significant failure rate; my best guess is that a LIFTwould have a 20 to 25% success rate.  I think the success of an endorectal advancement flap would be similar or less, and opined that a full-thickness flap to my mind would have a higher chance of healing than a mucosal flap alone.  Dakota is consulted several plastic surgeons regarding the previous suggestion of a gracilis transposition.  I again emphasized that the fundamental problem is the internal opening that needs to be healed and that a gracilis would not necessarily accomplish this.  His tract has evidently shrunken, and in the absence of an actual cavity exactly how to get the chrysalis in place and where to put it seems to me to be problematic.  The most recent plastic surgery consultations seem to have advised against the gracilis so I do not think this is an active issue.  I endorsed the plan for seton at present.  Dakota can decide if he wants to maintain his stoma on a permanent basis and, if so, he might consider having a colostomy placed rather than an ileostomy to facilitate management and allow him the opportunity of stoma irrigation  if he prefers.  If he has recurrent problems with his parastomal hernia, he may require an operation in any case.  We reviewed all the foregoing in very substantial detail.  I answered all of Dakota's questions to his stated satisfaction.  He has a quite clear understanding of his situation and has done quite a bit of reading about it.  I answered all of his questions to his stated satisfaction.  He expressed his understanding.  His plan is to continue his ongoing care with Dr. Means, and I endorsed him as an outstanding clinician and surgeon.    Total time 26 minutes.  Essentially the entire time was counseling.    Video-Visit Details    Type of service:  Video Visit    Video End Time (time video stopped): 10:33    Originating Location (pt. Location): Home    Distant Location (provider location):  [facility] COLON AND RECTAL SURGERY     Mode of Communication:  Video Conference via [employer].    For details of past medical history, surgical history, family history, medications, allergies, and review of systems, please see details below.    Medical history:  No past medical history on file.    Surgical history:  No past surgical history on file.    Family history:  No family history on file.    Medications:  Current Outpatient Medications   Medication Sig Dispense Refill     aspirin 81 MG tablet        Cholecalciferol (VITAMIN D-3) 1000 UNITS CAPS        ezetimibe (ZETIA) 10 MG tablet        Gauze Pads & Dressings (RA GAUZE BANDAGE) MISC Apply 2-3 Units topically       ibuprofen (ADVIL/MOTRIN) 200 MG tablet        mirtazapine (REMERON) 15 MG tablet        Niacin, Antihyperlipidemic, 500 MG TABS        Omega-3 Fatty Acids (FISH OIL) 1200 MG CAPS        rosuvastatin (CRESTOR) 10 MG tablet        tamsulosin (FLOMAX) 0.4 MG capsule          Allergies:  The patienthas No Known Allergies.    Social history:  Social History     Tobacco Use     Smoking status: Never Smoker   Substance Use Topics     Alcohol use: Yes     Alcohol/week:  7.0 standard drinks     Types: 7 Standard drinks or equivalent per week     Comment: 1 drink with dinner     Marital status: .    Review of Systems:  There are no exam notes on file for this visit.     Matt Gray MD   Professor and Chief  Division of Colon and Rectal Surgery  [facility]      Referring Provider:  Referred Self, MD  No address on file     Primary Care Provider:  No primary care provider on file.    This note was created using speech recognition software and may contain unintended word substitutions.     Again, thank you for allowing me to participate in the care of your patient.      Sincerely,    Matt Gray MD

## 2020-07-23 NOTE — NURSING NOTE
"Chief Complaint   Patient presents with     RECHECK     Discuss fistula repair again       Vitals:    07/23/20 0932   Weight: 188 lb   Height: 5' 10\"       Body mass index is 26.98 kg/m .      Tio Ramires LPN                        "

## 2020-07-23 NOTE — LETTER
Date:July 31, 2020      Patient was self referred, no letter generated. Do not send.        [facility] Information

## 2020-12-27 ENCOUNTER — HEALTH MAINTENANCE LETTER (OUTPATIENT)
Age: 70
End: 2020-12-27

## 2021-04-24 ENCOUNTER — HEALTH MAINTENANCE LETTER (OUTPATIENT)
Age: 71
End: 2021-04-24

## 2021-10-09 ENCOUNTER — HEALTH MAINTENANCE LETTER (OUTPATIENT)
Age: 71
End: 2021-10-09

## 2022-05-16 ENCOUNTER — HEALTH MAINTENANCE LETTER (OUTPATIENT)
Age: 72
End: 2022-05-16

## 2022-09-11 ENCOUNTER — HEALTH MAINTENANCE LETTER (OUTPATIENT)
Age: 72
End: 2022-09-11

## 2023-06-03 ENCOUNTER — HEALTH MAINTENANCE LETTER (OUTPATIENT)
Age: 73
End: 2023-06-03